# Patient Record
Sex: MALE | Race: OTHER | Employment: FULL TIME | URBAN - METROPOLITAN AREA
[De-identification: names, ages, dates, MRNs, and addresses within clinical notes are randomized per-mention and may not be internally consistent; named-entity substitution may affect disease eponyms.]

---

## 2018-05-23 ENCOUNTER — OFFICE VISIT (OUTPATIENT)
Dept: INTERNAL MEDICINE CLINIC | Age: 28
End: 2018-05-23

## 2018-05-23 VITALS
HEART RATE: 66 BPM | RESPIRATION RATE: 16 BRPM | HEIGHT: 69 IN | WEIGHT: 244 LBS | SYSTOLIC BLOOD PRESSURE: 124 MMHG | TEMPERATURE: 98.4 F | OXYGEN SATURATION: 99 % | DIASTOLIC BLOOD PRESSURE: 74 MMHG | BODY MASS INDEX: 36.14 KG/M2

## 2018-05-23 DIAGNOSIS — Z23 NEED FOR PROPHYLACTIC VACCINATION AGAINST DIPHTHERIA-TETANUS-PERTUSSIS (DTP): ICD-10-CM

## 2018-05-23 DIAGNOSIS — M25.579 ANKLE PAIN, UNSPECIFIED CHRONICITY, UNSPECIFIED LATERALITY: ICD-10-CM

## 2018-05-23 DIAGNOSIS — Z00.00 ENCOUNTER FOR PREVENTATIVE ADULT HEALTH CARE EXAMINATION: Primary | ICD-10-CM

## 2018-05-23 DIAGNOSIS — Z00.00 ENCOUNTER FOR PREVENTATIVE ADULT HEALTH CARE EXAMINATION: ICD-10-CM

## 2018-05-23 LAB
A/G RATIO: 2.1 (ref 1.1–2.2)
ALBUMIN SERPL-MCNC: 4.7 G/DL (ref 3.4–5)
ALP BLD-CCNC: 68 U/L (ref 40–129)
ALT SERPL-CCNC: 13 U/L (ref 10–40)
ANION GAP SERPL CALCULATED.3IONS-SCNC: 12 MMOL/L (ref 3–16)
AST SERPL-CCNC: 22 U/L (ref 15–37)
BILIRUB SERPL-MCNC: 0.6 MG/DL (ref 0–1)
BUN BLDV-MCNC: 9 MG/DL (ref 7–20)
CALCIUM SERPL-MCNC: 9.5 MG/DL (ref 8.3–10.6)
CHLORIDE BLD-SCNC: 103 MMOL/L (ref 99–110)
CHOLESTEROL, TOTAL: 153 MG/DL (ref 0–199)
CO2: 26 MMOL/L (ref 21–32)
CREAT SERPL-MCNC: 0.8 MG/DL (ref 0.9–1.3)
GFR AFRICAN AMERICAN: >60
GFR NON-AFRICAN AMERICAN: >60
GLOBULIN: 2.2 G/DL
GLUCOSE BLD-MCNC: 91 MG/DL (ref 70–99)
HDLC SERPL-MCNC: 45 MG/DL (ref 40–60)
LDL CHOLESTEROL CALCULATED: 86 MG/DL
POTASSIUM SERPL-SCNC: 4.3 MMOL/L (ref 3.5–5.1)
SODIUM BLD-SCNC: 141 MMOL/L (ref 136–145)
TOTAL PROTEIN: 6.9 G/DL (ref 6.4–8.2)
TRIGL SERPL-MCNC: 108 MG/DL (ref 0–150)
VLDLC SERPL CALC-MCNC: 22 MG/DL

## 2018-05-23 PROCEDURE — 99395 PREV VISIT EST AGE 18-39: CPT | Performed by: INTERNAL MEDICINE

## 2018-05-23 ASSESSMENT — PATIENT HEALTH QUESTIONNAIRE - PHQ9
1. LITTLE INTEREST OR PLEASURE IN DOING THINGS: 0
SUM OF ALL RESPONSES TO PHQ QUESTIONS 1-9: 0
2. FEELING DOWN, DEPRESSED OR HOPELESS: 0
SUM OF ALL RESPONSES TO PHQ9 QUESTIONS 1 & 2: 0

## 2018-08-09 ENCOUNTER — OFFICE VISIT (OUTPATIENT)
Dept: ORTHOPEDIC SURGERY | Age: 28
End: 2018-08-09

## 2018-08-09 VITALS
BODY MASS INDEX: 32.9 KG/M2 | WEIGHT: 235 LBS | HEIGHT: 71 IN | SYSTOLIC BLOOD PRESSURE: 112 MMHG | DIASTOLIC BLOOD PRESSURE: 74 MMHG | HEART RATE: 76 BPM

## 2018-08-09 DIAGNOSIS — M25.571 RIGHT ANKLE PAIN, UNSPECIFIED CHRONICITY: Primary | ICD-10-CM

## 2018-08-09 PROCEDURE — 99203 OFFICE O/P NEW LOW 30 MIN: CPT | Performed by: PODIATRIST

## 2018-08-09 PROCEDURE — L3040 FT ARCH SUPRT PREMOLD LONGIT: HCPCS | Performed by: PODIATRIST

## 2018-08-09 PROCEDURE — L1902 AFO ANKLE GAUNTLET PRE OTS: HCPCS | Performed by: PODIATRIST

## 2020-07-23 ENCOUNTER — OFFICE VISIT (OUTPATIENT)
Dept: PRIMARY CARE CLINIC | Age: 30
End: 2020-07-23

## 2020-07-23 PROCEDURE — 99211 OFF/OP EST MAY X REQ PHY/QHP: CPT | Performed by: NURSE PRACTITIONER

## 2020-07-23 NOTE — PATIENT INSTRUCTIONS

## 2020-07-23 NOTE — PROGRESS NOTES
Teresa Grewal received a viral test for COVID-19. They were educated on isolation and quarantine as appropriate. For any symptoms, they were directed to seek care from their PCP, given contact information to establish with a doctor, directed to an urgent care or the emergency room.

## 2020-07-28 LAB
SARS-COV-2: NOT DETECTED
SOURCE: NORMAL